# Patient Record
Sex: FEMALE | Race: WHITE | NOT HISPANIC OR LATINO | Employment: OTHER | URBAN - METROPOLITAN AREA
[De-identification: names, ages, dates, MRNs, and addresses within clinical notes are randomized per-mention and may not be internally consistent; named-entity substitution may affect disease eponyms.]

---

## 2019-02-05 ENCOUNTER — TELEPHONE (OUTPATIENT)
Dept: NEUROLOGY | Facility: CLINIC | Age: 69
End: 2019-02-05

## 2019-02-05 NOTE — TELEPHONE ENCOUNTER
Spoke with patient about getting an appointment with Dr. Ahuja. Told patient I would check around in the schedule between the two campus we go to and told her I would give her a call tomorrow with some more information on an appointment. Patient was please said she was looking forwarding to talking tomorrow.

## 2019-02-05 NOTE — TELEPHONE ENCOUNTER
----- Message from Sparkle Whitten sent at 2/5/2019  9:32 AM CST -----  Contact: Pt  Name of Who is CallingMIGUEL POWELL [[id number]]    What is the request in detail:Pt is calling in regards to getting information on [facility] therapy for her back.Please contact to further discuss and advise.        Can the clinic reply by [facility]:no      What Number to Call Back if not in [facility]:[phone]

## 2019-02-11 ENCOUNTER — TELEPHONE (OUTPATIENT)
Dept: NEUROLOGY | Facility: CLINIC | Age: 69
End: 2019-02-11

## 2019-02-11 NOTE — TELEPHONE ENCOUNTER
Franky Thorne,    Did you call the patient in regards to getting her an  appointment? Patient stated she spoke with you on the 5th and it has been a couple days and she still has not received a call. Please give this patient a call today to discuss this matter.   Thanks,  Hilton

## 2019-02-11 NOTE — TELEPHONE ENCOUNTER
----- Message from Jillian Verma sent at 2/11/2019 10:04 AM CST -----  Contact: pt  Name of Who is Calling:Shad      What is the request in detail: Pt is still awaiting a call back in reference to previous msg left on 2/05. Please ana dn advise       Can the clinic reply by [facility]: no      What Number to Call Back if not in [facility]: [phone]

## 2019-05-07 ENCOUNTER — OFFICE VISIT (OUTPATIENT)
Dept: NEUROLOGY | Facility: CLINIC | Age: 69
End: 2019-05-07
Payer: MEDICARE

## 2019-05-07 VITALS
BODY MASS INDEX: 28.02 KG/M2 | SYSTOLIC BLOOD PRESSURE: 130 MMHG | DIASTOLIC BLOOD PRESSURE: 80 MMHG | HEIGHT: 65 IN | WEIGHT: 168.19 LBS | HEART RATE: 78 BPM

## 2019-05-07 DIAGNOSIS — R51.9 CHRONIC DAILY HEADACHE: Primary | ICD-10-CM

## 2019-05-07 PROCEDURE — 99203 OFFICE O/P NEW LOW 30 MIN: CPT | Mod: S$GLB,,, | Performed by: PSYCHIATRY & NEUROLOGY

## 2019-05-07 PROCEDURE — 99999 PR PBB SHADOW E&M-EST. PATIENT-LVL III: ICD-10-PCS | Mod: PBBFAC,,, | Performed by: PSYCHIATRY & NEUROLOGY

## 2019-05-07 PROCEDURE — 99203 PR OFFICE/OUTPT VISIT, NEW, LEVL III, 30-44 MIN: ICD-10-PCS | Mod: S$GLB,,, | Performed by: PSYCHIATRY & NEUROLOGY

## 2019-05-07 PROCEDURE — 99999 PR PBB SHADOW E&M-EST. PATIENT-LVL III: CPT | Mod: PBBFAC,,, | Performed by: PSYCHIATRY & NEUROLOGY

## 2019-05-07 RX ORDER — ATORVASTATIN CALCIUM 20 MG/1
TABLET, FILM COATED ORAL
COMMUNITY
Start: 2019-05-06

## 2019-05-07 RX ORDER — ESCITALOPRAM OXALATE 20 MG/1
TABLET ORAL
COMMUNITY
Start: 2019-05-06

## 2019-05-07 RX ORDER — PANTOPRAZOLE SODIUM 40 MG/1
TABLET, DELAYED RELEASE ORAL
COMMUNITY
Start: 2019-05-06

## 2019-05-07 RX ORDER — HYDROCODONE BITARTRATE AND ACETAMINOPHEN 10; 325 MG/1; MG/1
TABLET ORAL
COMMUNITY
Start: 2018-12-05

## 2019-05-07 RX ORDER — TOPIRAMATE 200 MG/1
TABLET ORAL
COMMUNITY
Start: 2018-12-05 | End: 2021-10-05

## 2019-05-07 RX ORDER — MELOXICAM 15 MG/1
15 TABLET ORAL
COMMUNITY
Start: 2019-03-28 | End: 2021-10-05

## 2019-05-07 RX ORDER — TIZANIDINE 4 MG/1
TABLET ORAL
COMMUNITY
Start: 2019-05-06

## 2019-05-07 RX ORDER — BUPROPION HYDROCHLORIDE 150 MG/1
TABLET, EXTENDED RELEASE ORAL
COMMUNITY
Start: 2018-10-02

## 2019-05-07 RX ORDER — LEVOCETIRIZINE DIHYDROCHLORIDE 5 MG/1
TABLET, FILM COATED ORAL
COMMUNITY
Start: 2019-03-13

## 2019-05-07 NOTE — PROGRESS NOTES
"Subjective:      Patient ID: Shad Knutson is a 69 y.o. female.    Chief Complaint: I have headaches every day     This right-handed patient indicates that she has had a chronic daily headache for a large number of years and has been through a great deal of treatment without benefit.  The patient states that her headache is present when she awakens in the morning and is present throughout the day getting worse as the day goes by.  Her worst headache a occurs between 7 and 8:00 p.m. In the evening although she has headache throughout the day.  The pain apparently is felt 1st as a dull discomfort in the back of the head neck which then worsens and spreads to the bitemporal and biparietal area in the afternoon.  When the pain is severe she describes the pain as "exploding" and is "a ticking time bomb. "Ce    The patient states that when the pain is severe she experiences nausea and vomiting and is incapacitated.  She has found that when the pain is there is that she has to be very careful with her diet as "heavy food" will clearly aggravated her nausea and vomiting.    In review of her medical chart but also in conversation with the patient, she has been on multiple modalities of treatment through the years.  This has included among other treatments an occipital nerve neurectomy, Cefaly,  Trigger point injections, Botox, and multiple medications.  At 1 point in time, the patient was on Topamax to a dose level of 900 mg daily.  The patient states that she was withdrawn from Topamax but could not tolerate being off of the medication and currently is back on Topamax 600 mg a day.  She has also been on multiple other medications including Elavil, Aimovig (which made her headache worse), various triptans, and Norco which she now limits to 2 tablets a day usually 2-3 days a week.  The patient has had acupuncture but has not tried biofeedback.  The patient has seen neurologists both in   [place]  as well as in " [place].        ROS:  GENERAL: NO FEVER, CHILLS, FATIGABILITY OR WEIGHT LOSS.  SKIN: NO RASHES, ITCHING OR CHANGES IN COLOR OR TEXTURE OF SKIN.  HEAD: NO RECENT HEAD TRAUMA.  EYES: VISUAL ACUITY FINE. NO PHOTOPHOBIA, OCULAR PAIN OR DIPLOPIA.  EARS: DENIES EAR PAIN, DISCHARGE OR VERTIGO.  NOSE: NO LOSS OF SMELL, NO EPISTAXIS OR POSTNASAL DRIP.  MOUTH & THROAT: NO HOARSENESS OR CHANGE IN VOICE. NO EXCESSIVE GUM BLEEDING.  NODES: DENIES SWOLLEN GLANDS.  CHEST: DENIES ROBBINS, CYANOSIS, WHEEZING, COUGH AND SPUTUM PRODUCTION.  CARDIOVASCULAR: DENIES CHEST PAIN, PND, ORTHOPNEA OR REDUCED EXERCISE TOLERANCE.  ABDOMEN: APPETITE FINE. NO WEIGHT LOSS. DENIES DIARRHEA, ABDOMINAL PAIN, HEMATEMESIS OR BLOOD IN STOOL.  URINARY: NO FLANK PAIN, DYSURIA OR HEMATURIA.  PERIPHERAL VASCULAR: NO CLAUDICATION OR CYANOSIS.  MUSCULOSKELETAL: NO JOINT STIFFNESS OR SWELLING. DENIES BACK PAIN.  NEUROLOGIC: NO HISTORY OF SEIZURES, PARALYSIS, ALTERATION OF GAIT OR COORDINATION.    Past Medical History:   Diagnosis Date    Headache     Hearing loss     Mental disorder      History reviewed. No pertinent surgical history.  History reviewed. No pertinent family history.  Social History     Socioeconomic History    Marital status:      Spouse name: Not on file    Number of children: Not on file    Years of education: Not on file    Highest education level: Not on file   Occupational History    Not on file   Social Needs    Financial resource strain: Not on file    Food insecurity:     Worry: Not on file     Inability: Not on file    Transportation needs:     Medical: Not on file     Non-medical: Not on file   Tobacco Use    Smoking status: Never Smoker    Smokeless tobacco: Never Used   Substance and Sexual Activity    Alcohol use: Yes    Drug use: Never    Sexual activity: Yes     Partners: Male   Lifestyle    Physical activity:     Days per week: Not on file     Minutes per session: Not on file    Stress: Not on file    Relationships    Social connections:     Talks on phone: Not on file     Gets together: Not on file     Attends [employer] service: Not on file     Active member of club or organization: Not on file     Attends meetings of clubs or organizations: Not on file     Relationship status: Not on file   Other Topics Concern    Not on file   Social History Narrative    Not on file         Objective:   PE:   VITAL SIGNS:  Blood pressure 130/80, pulse 78, weight 76.3 kg, height 5 ft 5 in, BMI 27.99  Vitals:    05/07/19 1022   BP: 130/80   Pulse: 78      formal neurologic and physical examination was not performed on today's visit as a history was obtained in detail and it was determined that this examiner would not be able to offer her any further assistance with management of her chronic daily headache.      Assessment:     Encounter Diagnosis   Name Primary?    Chronic daily headache Yes       Plan:     1.  The patient has found a provider in [place] who may be able to offer her a different approach to her headache syndrome and we will make that referral today to Dr. Larry Ahuja. In the interval, she is to continue her medications as provided by her primary care physician.      This was a 45 min visit with the patient and her  with 100% of the time spent counseling the patient and  regarding the treatment of her chronic daily headache and discussion of future management of chronic daily headache.  This note is generated with speech recognition software and is subject to transcription error and sound alike phrases that may be missed by proofreading.

## 2019-05-16 ENCOUNTER — TELEPHONE (OUTPATIENT)
Dept: NEPHROLOGY | Facility: CLINIC | Age: 69
End: 2019-05-16

## 2019-05-16 NOTE — TELEPHONE ENCOUNTER
----- Message from Jillian Fabian sent at 5/16/2019  1:53 PM CDT -----  Contact: Self  Pt is calling in regards to referral      pls call pt back at [phone]

## 2019-05-16 NOTE — TELEPHONE ENCOUNTER
Called to give her the number to [facility] to schedule with them. They did not schedule her for dr almonte due to the note that he only sees TBI,concussion and not ha. She will call them now. Sent message to dr green about the ref.5/16/1916/19/1656/sf

## 2019-05-17 ENCOUNTER — TELEPHONE (OUTPATIENT)
Dept: NEUROLOGY | Facility: CLINIC | Age: 69
End: 2019-05-17

## 2019-05-17 DIAGNOSIS — G43.019 INTRACTABLE MIGRAINE WITHOUT AURA AND WITHOUT STATUS MIGRAINOSUS: Primary | ICD-10-CM

## 2019-05-17 NOTE — TELEPHONE ENCOUNTER
----- Message from Cassie Garza LPN sent at 5/16/2019  4:50 PM CDT -----  Contact: Self  Please rewrite referral for n.o. Neurology from dr almonte to [facility]... He only wants to see TBI and concussion etc thanks  ----- Message -----  From: Jillian Fabian  Sent: 5/16/2019   1:53 PM  To: Hyun FERREIRA Staff    Pt is calling in regards to referral      pls call pt back at [phone]

## 2019-05-24 ENCOUNTER — TELEPHONE (OUTPATIENT)
Dept: NEUROLOGY | Facility: CLINIC | Age: 69
End: 2019-05-24

## 2019-05-24 NOTE — TELEPHONE ENCOUNTER
----- Message from Tiana Silverman, RN sent at 5/21/2019 12:28 PM CDT -----  Danie,  Could you try to schedule Patient with Dr Beltran?Dr Gonzalez's schedule is out until July.  Thank you so much,  Tiana  ----- Message -----  From: Bob Lira  Sent: 5/21/2019  11:40 AM  To: Carlos Walters Staff    Needs Advice    Reason for call: Pt is calling to schedule an appt w/ the doctor. Referral in [facility]        Communication Preference: [phone]    Additional Information:

## 2021-08-11 ENCOUNTER — TELEPHONE (OUTPATIENT)
Dept: NEUROLOGY | Facility: CLINIC | Age: 71
End: 2021-08-11

## 2021-08-16 ENCOUNTER — TELEPHONE (OUTPATIENT)
Dept: NEUROLOGY | Facility: CLINIC | Age: 71
End: 2021-08-16

## 2021-10-05 ENCOUNTER — OFFICE VISIT (OUTPATIENT)
Dept: NEUROLOGY | Facility: CLINIC | Age: 71
End: 2021-10-05
Payer: MEDICARE

## 2021-10-05 VITALS
WEIGHT: 162.5 LBS | DIASTOLIC BLOOD PRESSURE: 76 MMHG | HEIGHT: 65 IN | HEART RATE: 75 BPM | SYSTOLIC BLOOD PRESSURE: 124 MMHG | BODY MASS INDEX: 27.07 KG/M2

## 2021-10-05 DIAGNOSIS — G44.86 CERVICOGENIC HEADACHE: ICD-10-CM

## 2021-10-05 DIAGNOSIS — M54.81 BILATERAL OCCIPITAL NEURALGIA: Primary | ICD-10-CM

## 2021-10-05 PROCEDURE — 1125F AMNT PAIN NOTED PAIN PRSNT: CPT | Mod: CPTII,S$GLB,, | Performed by: PSYCHIATRY & NEUROLOGY

## 2021-10-05 PROCEDURE — 3288F FALL RISK ASSESSMENT DOCD: CPT | Mod: CPTII,S$GLB,, | Performed by: PSYCHIATRY & NEUROLOGY

## 2021-10-05 PROCEDURE — 99215 PR OFFICE/OUTPT VISIT, EST, LEVL V, 40-54 MIN: ICD-10-PCS | Mod: S$GLB,,, | Performed by: PSYCHIATRY & NEUROLOGY

## 2021-10-05 PROCEDURE — 1101F PT FALLS ASSESS-DOCD LE1/YR: CPT | Mod: CPTII,S$GLB,, | Performed by: PSYCHIATRY & NEUROLOGY

## 2021-10-05 PROCEDURE — 99999 PR PBB SHADOW E&M-EST. PATIENT-LVL III: ICD-10-PCS | Mod: PBBFAC,,, | Performed by: PSYCHIATRY & NEUROLOGY

## 2021-10-05 PROCEDURE — 99999 PR PBB SHADOW E&M-EST. PATIENT-LVL III: CPT | Mod: PBBFAC,,, | Performed by: PSYCHIATRY & NEUROLOGY

## 2021-10-05 PROCEDURE — 3074F PR MOST RECENT SYSTOLIC BLOOD PRESSURE < 130 MM HG: ICD-10-PCS | Mod: CPTII,S$GLB,, | Performed by: PSYCHIATRY & NEUROLOGY

## 2021-10-05 PROCEDURE — 3078F DIAST BP <80 MM HG: CPT | Mod: CPTII,S$GLB,, | Performed by: PSYCHIATRY & NEUROLOGY

## 2021-10-05 PROCEDURE — 1125F PR PAIN SEVERITY QUANTIFIED, PAIN PRESENT: ICD-10-PCS | Mod: CPTII,S$GLB,, | Performed by: PSYCHIATRY & NEUROLOGY

## 2021-10-05 PROCEDURE — 3074F SYST BP LT 130 MM HG: CPT | Mod: CPTII,S$GLB,, | Performed by: PSYCHIATRY & NEUROLOGY

## 2021-10-05 PROCEDURE — 3008F BODY MASS INDEX DOCD: CPT | Mod: CPTII,S$GLB,, | Performed by: PSYCHIATRY & NEUROLOGY

## 2021-10-05 PROCEDURE — 1159F MED LIST DOCD IN RCRD: CPT | Mod: CPTII,S$GLB,, | Performed by: PSYCHIATRY & NEUROLOGY

## 2021-10-05 PROCEDURE — 3288F PR FALLS RISK ASSESSMENT DOCUMENTED: ICD-10-PCS | Mod: CPTII,S$GLB,, | Performed by: PSYCHIATRY & NEUROLOGY

## 2021-10-05 PROCEDURE — 1160F RVW MEDS BY RX/DR IN RCRD: CPT | Mod: CPTII,S$GLB,, | Performed by: PSYCHIATRY & NEUROLOGY

## 2021-10-05 PROCEDURE — 1160F PR REVIEW ALL MEDS BY PRESCRIBER/CLIN PHARMACIST DOCUMENTED: ICD-10-PCS | Mod: CPTII,S$GLB,, | Performed by: PSYCHIATRY & NEUROLOGY

## 2021-10-05 PROCEDURE — 99215 OFFICE O/P EST HI 40 MIN: CPT | Mod: S$GLB,,, | Performed by: PSYCHIATRY & NEUROLOGY

## 2021-10-05 PROCEDURE — 1159F PR MEDICATION LIST DOCUMENTED IN MEDICAL RECORD: ICD-10-PCS | Mod: CPTII,S$GLB,, | Performed by: PSYCHIATRY & NEUROLOGY

## 2021-10-05 PROCEDURE — 3008F PR BODY MASS INDEX (BMI) DOCUMENTED: ICD-10-PCS | Mod: CPTII,S$GLB,, | Performed by: PSYCHIATRY & NEUROLOGY

## 2021-10-05 PROCEDURE — 1101F PR PT FALLS ASSESS DOC 0-1 FALLS W/OUT INJ PAST YR: ICD-10-PCS | Mod: CPTII,S$GLB,, | Performed by: PSYCHIATRY & NEUROLOGY

## 2021-10-05 PROCEDURE — 3078F PR MOST RECENT DIASTOLIC BLOOD PRESSURE < 80 MM HG: ICD-10-PCS | Mod: CPTII,S$GLB,, | Performed by: PSYCHIATRY & NEUROLOGY

## 2021-10-05 RX ORDER — CHOLECALCIFEROL (VITAMIN D3) 25 MCG
1000 TABLET ORAL DAILY
COMMUNITY

## 2021-10-05 RX ORDER — OXYCODONE AND ACETAMINOPHEN 10; 325 MG/1; MG/1
1 TABLET ORAL EVERY 4 HOURS PRN
COMMUNITY

## 2021-10-05 RX ORDER — ASPIRIN 81 MG
TABLET,CHEWABLE ORAL
COMMUNITY
Start: 2021-06-08

## 2021-10-05 RX ORDER — MECLIZINE HYDROCHLORIDE 25 MG/1
TABLET ORAL
COMMUNITY
Start: 2021-07-26

## 2021-10-06 ENCOUNTER — PATIENT MESSAGE (OUTPATIENT)
Dept: NEUROLOGY | Facility: CLINIC | Age: 71
End: 2021-10-06

## 2021-10-06 ENCOUNTER — HOSPITAL ENCOUNTER (OUTPATIENT)
Dept: RADIOLOGY | Facility: HOSPITAL | Age: 71
Discharge: HOME | End: 2021-10-06
Attending: PSYCHIATRY & NEUROLOGY
Payer: MEDICARE

## 2021-10-06 DIAGNOSIS — G44.86 CERVICOGENIC HEADACHE: ICD-10-CM

## 2021-10-06 PROCEDURE — 72050 X-RAY EXAM NECK SPINE 4/5VWS: CPT | Mod: TC

## 2021-10-11 ENCOUNTER — TELEPHONE (OUTPATIENT)
Dept: NEUROLOGY | Facility: CLINIC | Age: 71
End: 2021-10-11

## 2021-10-28 ENCOUNTER — OFFICE VISIT (OUTPATIENT)
Dept: NEUROLOGY | Facility: CLINIC | Age: 71
End: 2021-10-28
Payer: MEDICARE

## 2021-10-28 VITALS
HEART RATE: 71 BPM | BODY MASS INDEX: 26.99 KG/M2 | DIASTOLIC BLOOD PRESSURE: 91 MMHG | SYSTOLIC BLOOD PRESSURE: 148 MMHG | WEIGHT: 162 LBS | HEIGHT: 65 IN

## 2021-10-28 DIAGNOSIS — M54.81 BILATERAL OCCIPITAL NEURALGIA: ICD-10-CM

## 2021-10-28 DIAGNOSIS — G44.86 CERVICOGENIC HEADACHE: ICD-10-CM

## 2021-10-28 PROCEDURE — 1125F PR PAIN SEVERITY QUANTIFIED, PAIN PRESENT: ICD-10-PCS | Mod: CPTII,S$GLB,, | Performed by: PSYCHIATRY & NEUROLOGY

## 2021-10-28 PROCEDURE — 3077F SYST BP >= 140 MM HG: CPT | Mod: CPTII,S$GLB,, | Performed by: PSYCHIATRY & NEUROLOGY

## 2021-10-28 PROCEDURE — 1101F PR PT FALLS ASSESS DOC 0-1 FALLS W/OUT INJ PAST YR: ICD-10-PCS | Mod: CPTII,S$GLB,, | Performed by: PSYCHIATRY & NEUROLOGY

## 2021-10-28 PROCEDURE — 3008F PR BODY MASS INDEX (BMI) DOCUMENTED: ICD-10-PCS | Mod: CPTII,S$GLB,, | Performed by: PSYCHIATRY & NEUROLOGY

## 2021-10-28 PROCEDURE — 3008F BODY MASS INDEX DOCD: CPT | Mod: CPTII,S$GLB,, | Performed by: PSYCHIATRY & NEUROLOGY

## 2021-10-28 PROCEDURE — 3288F FALL RISK ASSESSMENT DOCD: CPT | Mod: CPTII,S$GLB,, | Performed by: PSYCHIATRY & NEUROLOGY

## 2021-10-28 PROCEDURE — 99499 NO LOS: ICD-10-PCS | Mod: S$GLB,,, | Performed by: PSYCHIATRY & NEUROLOGY

## 2021-10-28 PROCEDURE — 64405 PR NERVE BLOCK INJ, ANES/STEROID, OCCIPITAL: ICD-10-PCS | Mod: 50,51,S$GLB, | Performed by: PSYCHIATRY & NEUROLOGY

## 2021-10-28 PROCEDURE — 99999 PR PBB SHADOW E&M-EST. PATIENT-LVL III: CPT | Mod: PBBFAC,,, | Performed by: PSYCHIATRY & NEUROLOGY

## 2021-10-28 PROCEDURE — 64450 PR NERVE BLOCK INJ, ANES/STEROID, OTHER PERIPHERAL: ICD-10-PCS | Mod: 50,S$GLB,, | Performed by: PSYCHIATRY & NEUROLOGY

## 2021-10-28 PROCEDURE — 3077F PR MOST RECENT SYSTOLIC BLOOD PRESSURE >= 140 MM HG: ICD-10-PCS | Mod: CPTII,S$GLB,, | Performed by: PSYCHIATRY & NEUROLOGY

## 2021-10-28 PROCEDURE — 3080F DIAST BP >= 90 MM HG: CPT | Mod: CPTII,S$GLB,, | Performed by: PSYCHIATRY & NEUROLOGY

## 2021-10-28 PROCEDURE — 64450 NJX AA&/STRD OTHER PN/BRANCH: CPT | Mod: 50,S$GLB,, | Performed by: PSYCHIATRY & NEUROLOGY

## 2021-10-28 PROCEDURE — 1101F PT FALLS ASSESS-DOCD LE1/YR: CPT | Mod: CPTII,S$GLB,, | Performed by: PSYCHIATRY & NEUROLOGY

## 2021-10-28 PROCEDURE — 76942 ECHO GUIDE FOR BIOPSY: CPT | Mod: S$GLB,,, | Performed by: PSYCHIATRY & NEUROLOGY

## 2021-10-28 PROCEDURE — 64405 NJX AA&/STRD GR OCPL NRV: CPT | Mod: 50,51,S$GLB, | Performed by: PSYCHIATRY & NEUROLOGY

## 2021-10-28 PROCEDURE — 99499 UNLISTED E&M SERVICE: CPT | Mod: S$GLB,,, | Performed by: PSYCHIATRY & NEUROLOGY

## 2021-10-28 PROCEDURE — 1159F MED LIST DOCD IN RCRD: CPT | Mod: CPTII,S$GLB,, | Performed by: PSYCHIATRY & NEUROLOGY

## 2021-10-28 PROCEDURE — 1125F AMNT PAIN NOTED PAIN PRSNT: CPT | Mod: CPTII,S$GLB,, | Performed by: PSYCHIATRY & NEUROLOGY

## 2021-10-28 PROCEDURE — 3080F PR MOST RECENT DIASTOLIC BLOOD PRESSURE >= 90 MM HG: ICD-10-PCS | Mod: CPTII,S$GLB,, | Performed by: PSYCHIATRY & NEUROLOGY

## 2021-10-28 PROCEDURE — 76942 PR U/S GUIDANCE FOR NEEDLE GUIDANCE: ICD-10-PCS | Mod: S$GLB,,, | Performed by: PSYCHIATRY & NEUROLOGY

## 2021-10-28 PROCEDURE — 1159F PR MEDICATION LIST DOCUMENTED IN MEDICAL RECORD: ICD-10-PCS | Mod: CPTII,S$GLB,, | Performed by: PSYCHIATRY & NEUROLOGY

## 2021-10-28 PROCEDURE — 3288F PR FALLS RISK ASSESSMENT DOCUMENTED: ICD-10-PCS | Mod: CPTII,S$GLB,, | Performed by: PSYCHIATRY & NEUROLOGY

## 2021-10-28 PROCEDURE — 99999 PR PBB SHADOW E&M-EST. PATIENT-LVL III: ICD-10-PCS | Mod: PBBFAC,,, | Performed by: PSYCHIATRY & NEUROLOGY

## 2021-11-08 ENCOUNTER — PATIENT MESSAGE (OUTPATIENT)
Dept: NEUROLOGY | Facility: CLINIC | Age: 71
End: 2021-11-08
Payer: MEDICARE

## 2021-11-09 ENCOUNTER — PATIENT MESSAGE (OUTPATIENT)
Dept: NEUROLOGY | Facility: CLINIC | Age: 71
End: 2021-11-09
Payer: MEDICARE

## 2021-11-09 DIAGNOSIS — G44.86 CERVICOGENIC HEADACHE: Primary | ICD-10-CM

## 2021-11-14 ENCOUNTER — PATIENT MESSAGE (OUTPATIENT)
Dept: NEUROLOGY | Facility: CLINIC | Age: 71
End: 2021-11-14
Payer: MEDICARE

## 2021-11-18 ENCOUNTER — TELEPHONE (OUTPATIENT)
Dept: PHARMACY | Facility: CLINIC | Age: 71
End: 2021-11-18
Payer: MEDICARE

## 2021-12-16 ENCOUNTER — PATIENT MESSAGE (OUTPATIENT)
Dept: NEUROLOGY | Facility: CLINIC | Age: 71
End: 2021-12-16
Payer: MEDICARE

## 2021-12-16 ENCOUNTER — TELEPHONE (OUTPATIENT)
Dept: NEUROLOGY | Facility: CLINIC | Age: 71
End: 2021-12-16
Payer: MEDICARE

## 2021-12-16 DIAGNOSIS — M50.30 DDD (DEGENERATIVE DISC DISEASE), CERVICAL: ICD-10-CM

## 2021-12-16 DIAGNOSIS — G43.001 MIGRAINE WITHOUT AURA AND WITH STATUS MIGRAINOSUS, NOT INTRACTABLE: ICD-10-CM

## 2021-12-16 DIAGNOSIS — G43.709 CHRONIC MIGRAINE WITHOUT AURA WITHOUT STATUS MIGRAINOSUS, NOT INTRACTABLE: ICD-10-CM

## 2021-12-16 DIAGNOSIS — M54.81 BILATERAL OCCIPITAL NEURALGIA: Primary | ICD-10-CM

## 2021-12-16 RX ORDER — SODIUM CHLORIDE 0.9 % (FLUSH) 0.9 %
10 SYRINGE (ML) INJECTION
Status: CANCELLED | OUTPATIENT
Start: 2021-12-16

## 2021-12-16 RX ORDER — ONDANSETRON 2 MG/ML
8 INJECTION INTRAMUSCULAR; INTRAVENOUS ONCE AS NEEDED
Status: CANCELLED | OUTPATIENT
Start: 2021-12-16

## 2021-12-16 RX ORDER — SODIUM CHLORIDE 9 MG/ML
INJECTION, SOLUTION INTRAVENOUS ONCE AS NEEDED
Status: CANCELLED | OUTPATIENT
Start: 2021-12-16

## 2021-12-16 RX ORDER — ACETAMINOPHEN 500 MG
1000 TABLET ORAL ONCE AS NEEDED
Status: CANCELLED | OUTPATIENT
Start: 2021-12-16

## 2021-12-16 RX ORDER — DIPHENHYDRAMINE HYDROCHLORIDE 50 MG/ML
25 INJECTION INTRAMUSCULAR; INTRAVENOUS ONCE AS NEEDED
Status: CANCELLED | OUTPATIENT
Start: 2021-12-16

## 2021-12-29 ENCOUNTER — PATIENT MESSAGE (OUTPATIENT)
Dept: NEUROLOGY | Facility: CLINIC | Age: 71
End: 2021-12-29
Payer: MEDICARE

## 2022-01-10 ENCOUNTER — HOSPITAL ENCOUNTER (OUTPATIENT)
Dept: RADIOLOGY | Facility: HOSPITAL | Age: 72
Discharge: HOME | End: 2022-01-10
Attending: PSYCHIATRY & NEUROLOGY
Payer: MEDICARE

## 2022-01-10 ENCOUNTER — INFUSION (OUTPATIENT)
Dept: INFECTIOUS DISEASES | Facility: HOSPITAL | Age: 72
End: 2022-01-10
Attending: INTERNAL MEDICINE
Payer: MEDICARE

## 2022-01-10 VITALS
BODY MASS INDEX: 27.44 KG/M2 | HEART RATE: 69 BPM | TEMPERATURE: 97 F | SYSTOLIC BLOOD PRESSURE: 153 MMHG | RESPIRATION RATE: 16 BRPM | OXYGEN SATURATION: 98 % | WEIGHT: 164.88 LBS | DIASTOLIC BLOOD PRESSURE: 70 MMHG

## 2022-01-10 DIAGNOSIS — M50.30 DDD (DEGENERATIVE DISC DISEASE), CERVICAL: ICD-10-CM

## 2022-01-10 DIAGNOSIS — M54.81 BILATERAL OCCIPITAL NEURALGIA: ICD-10-CM

## 2022-01-10 DIAGNOSIS — G43.709 CHRONIC MIGRAINE WITHOUT AURA WITHOUT STATUS MIGRAINOSUS, NOT INTRACTABLE: Primary | ICD-10-CM

## 2022-01-10 PROCEDURE — 63600175 PHARM REV CODE 636 W HCPCS: Performed by: PSYCHIATRY & NEUROLOGY

## 2022-01-10 PROCEDURE — 25000003 PHARM REV CODE 250: Performed by: PSYCHIATRY & NEUROLOGY

## 2022-01-10 PROCEDURE — 72141 MRI CERVICAL SPINE WITHOUT CONTRAST: ICD-10-PCS | Mod: 26,,, | Performed by: RADIOLOGY

## 2022-01-10 PROCEDURE — 96413 CHEMO IV INFUSION 1 HR: CPT

## 2022-01-10 PROCEDURE — 72141 MRI NECK SPINE W/O DYE: CPT | Mod: TC

## 2022-01-10 PROCEDURE — 72141 MRI NECK SPINE W/O DYE: CPT | Mod: 26,,, | Performed by: RADIOLOGY

## 2022-01-10 RX ORDER — SODIUM CHLORIDE 9 MG/ML
INJECTION, SOLUTION INTRAVENOUS ONCE AS NEEDED
Status: DISCONTINUED | OUTPATIENT
Start: 2022-01-10 | End: 2022-01-10 | Stop reason: HOSPADM

## 2022-01-10 RX ORDER — SODIUM CHLORIDE 0.9 % (FLUSH) 0.9 %
10 SYRINGE (ML) INJECTION
Status: DISCONTINUED | OUTPATIENT
Start: 2022-01-10 | End: 2022-01-10 | Stop reason: HOSPADM

## 2022-01-10 RX ORDER — ONDANSETRON 2 MG/ML
8 INJECTION INTRAMUSCULAR; INTRAVENOUS ONCE AS NEEDED
Status: DISCONTINUED | OUTPATIENT
Start: 2022-01-10 | End: 2022-01-10 | Stop reason: HOSPADM

## 2022-01-10 RX ORDER — ACETAMINOPHEN 500 MG
1000 TABLET ORAL ONCE AS NEEDED
Status: DISCONTINUED | OUTPATIENT
Start: 2022-01-10 | End: 2022-01-10 | Stop reason: HOSPADM

## 2022-01-10 RX ORDER — SODIUM CHLORIDE 9 MG/ML
INJECTION, SOLUTION INTRAVENOUS ONCE AS NEEDED
Status: CANCELLED | OUTPATIENT
Start: 2022-01-10

## 2022-01-10 RX ORDER — SODIUM CHLORIDE 0.9 % (FLUSH) 0.9 %
10 SYRINGE (ML) INJECTION
Status: CANCELLED | OUTPATIENT
Start: 2022-01-10

## 2022-01-10 RX ORDER — ACETAMINOPHEN 500 MG
1000 TABLET ORAL ONCE AS NEEDED
Status: CANCELLED | OUTPATIENT
Start: 2022-01-10

## 2022-01-10 RX ORDER — DIPHENHYDRAMINE HYDROCHLORIDE 50 MG/ML
25 INJECTION INTRAMUSCULAR; INTRAVENOUS ONCE AS NEEDED
Status: CANCELLED | OUTPATIENT
Start: 2022-01-10

## 2022-01-10 RX ORDER — ONDANSETRON 2 MG/ML
8 INJECTION INTRAMUSCULAR; INTRAVENOUS ONCE AS NEEDED
Status: CANCELLED | OUTPATIENT
Start: 2022-01-10

## 2022-01-10 RX ORDER — DIPHENHYDRAMINE HYDROCHLORIDE 50 MG/ML
25 INJECTION INTRAMUSCULAR; INTRAVENOUS ONCE AS NEEDED
Status: DISCONTINUED | OUTPATIENT
Start: 2022-01-10 | End: 2022-01-10 | Stop reason: HOSPADM

## 2022-01-10 RX ADMIN — EPTINEZUMAB-JJMR 300 MG: 100 INJECTION INTRAVENOUS at 11:01

## 2022-01-10 RX ADMIN — SODIUM CHLORIDE: 0.9 INJECTION, SOLUTION INTRAVENOUS at 11:01

## 2022-01-13 ENCOUNTER — PATIENT MESSAGE (OUTPATIENT)
Dept: NEUROLOGY | Facility: CLINIC | Age: 72
End: 2022-01-13
Payer: MEDICARE

## 2022-02-04 ENCOUNTER — TELEPHONE (OUTPATIENT)
Dept: NEUROLOGY | Facility: CLINIC | Age: 72
End: 2022-02-04
Payer: MEDICARE

## 2022-02-04 NOTE — TELEPHONE ENCOUNTER
----- Message from Krishna Vallejo sent at 2/4/2022 10:21 AM CST -----  Regarding: appt still needed      The Pt's  states that they sent over a msg 3 weeks ago for a call bk to sched the Pt's appt as Dr. Ahuja said he wanted to see the Pt before her April infusion appt.    Please contact the caller to sched.     # [phone]

## 2022-03-16 ENCOUNTER — OFFICE VISIT (OUTPATIENT)
Dept: NEUROLOGY | Facility: CLINIC | Age: 72
End: 2022-03-16
Payer: MEDICARE

## 2022-03-16 VITALS
HEIGHT: 65 IN | HEART RATE: 70 BPM | SYSTOLIC BLOOD PRESSURE: 133 MMHG | DIASTOLIC BLOOD PRESSURE: 78 MMHG | BODY MASS INDEX: 27.47 KG/M2 | WEIGHT: 164.88 LBS

## 2022-03-16 DIAGNOSIS — M54.81 BILATERAL OCCIPITAL NEURALGIA: ICD-10-CM

## 2022-03-16 DIAGNOSIS — M47.812 ARTHROPATHY OF CERVICAL FACET JOINT: ICD-10-CM

## 2022-03-16 DIAGNOSIS — M50.30 DDD (DEGENERATIVE DISC DISEASE), CERVICAL: Primary | ICD-10-CM

## 2022-03-16 PROCEDURE — 3078F PR MOST RECENT DIASTOLIC BLOOD PRESSURE < 80 MM HG: ICD-10-PCS | Mod: CPTII,S$GLB,, | Performed by: PSYCHIATRY & NEUROLOGY

## 2022-03-16 PROCEDURE — 1100F PR PT FALLS ASSESS DOC 2+ FALLS/FALL W/INJURY/YR: ICD-10-PCS | Mod: CPTII,S$GLB,, | Performed by: PSYCHIATRY & NEUROLOGY

## 2022-03-16 PROCEDURE — 3078F DIAST BP <80 MM HG: CPT | Mod: CPTII,S$GLB,, | Performed by: PSYCHIATRY & NEUROLOGY

## 2022-03-16 PROCEDURE — 3075F PR MOST RECENT SYSTOLIC BLOOD PRESS GE 130-139MM HG: ICD-10-PCS | Mod: CPTII,S$GLB,, | Performed by: PSYCHIATRY & NEUROLOGY

## 2022-03-16 PROCEDURE — 1159F PR MEDICATION LIST DOCUMENTED IN MEDICAL RECORD: ICD-10-PCS | Mod: CPTII,S$GLB,, | Performed by: PSYCHIATRY & NEUROLOGY

## 2022-03-16 PROCEDURE — 99214 OFFICE O/P EST MOD 30 MIN: CPT | Mod: S$GLB,,, | Performed by: PSYCHIATRY & NEUROLOGY

## 2022-03-16 PROCEDURE — 3008F BODY MASS INDEX DOCD: CPT | Mod: CPTII,S$GLB,, | Performed by: PSYCHIATRY & NEUROLOGY

## 2022-03-16 PROCEDURE — 1125F AMNT PAIN NOTED PAIN PRSNT: CPT | Mod: CPTII,S$GLB,, | Performed by: PSYCHIATRY & NEUROLOGY

## 2022-03-16 PROCEDURE — 3288F PR FALLS RISK ASSESSMENT DOCUMENTED: ICD-10-PCS | Mod: CPTII,S$GLB,, | Performed by: PSYCHIATRY & NEUROLOGY

## 2022-03-16 PROCEDURE — 1159F MED LIST DOCD IN RCRD: CPT | Mod: CPTII,S$GLB,, | Performed by: PSYCHIATRY & NEUROLOGY

## 2022-03-16 PROCEDURE — 3288F FALL RISK ASSESSMENT DOCD: CPT | Mod: CPTII,S$GLB,, | Performed by: PSYCHIATRY & NEUROLOGY

## 2022-03-16 PROCEDURE — 1100F PTFALLS ASSESS-DOCD GE2>/YR: CPT | Mod: CPTII,S$GLB,, | Performed by: PSYCHIATRY & NEUROLOGY

## 2022-03-16 PROCEDURE — 99214 PR OFFICE/OUTPT VISIT, EST, LEVL IV, 30-39 MIN: ICD-10-PCS | Mod: S$GLB,,, | Performed by: PSYCHIATRY & NEUROLOGY

## 2022-03-16 PROCEDURE — 1125F PR PAIN SEVERITY QUANTIFIED, PAIN PRESENT: ICD-10-PCS | Mod: CPTII,S$GLB,, | Performed by: PSYCHIATRY & NEUROLOGY

## 2022-03-16 PROCEDURE — 99999 PR PBB SHADOW E&M-EST. PATIENT-LVL IV: CPT | Mod: PBBFAC,,, | Performed by: PSYCHIATRY & NEUROLOGY

## 2022-03-16 PROCEDURE — 99999 PR PBB SHADOW E&M-EST. PATIENT-LVL IV: ICD-10-PCS | Mod: PBBFAC,,, | Performed by: PSYCHIATRY & NEUROLOGY

## 2022-03-16 PROCEDURE — 3075F SYST BP GE 130 - 139MM HG: CPT | Mod: CPTII,S$GLB,, | Performed by: PSYCHIATRY & NEUROLOGY

## 2022-03-16 PROCEDURE — 3008F PR BODY MASS INDEX (BMI) DOCUMENTED: ICD-10-PCS | Mod: CPTII,S$GLB,, | Performed by: PSYCHIATRY & NEUROLOGY

## 2022-03-16 NOTE — Clinical Note
Hey Marco,   I've got this nice lady I'm sending you for eval for AMARJIT or MBB, she has hada cervical fusion at C5-C6 (2005), and seems to have sequelae of ON from C3-4 and C4-C5 left greater than right NFS, probably due to hypermobility above the level of fusion. I have failed U/S nerve blocks on the scalp, so ultimately, I rely on your expertise to help this patient get some relief. As always, I appreciate your expertise.   Sincerely, Larry

## 2022-03-16 NOTE — PROGRESS NOTES
Subjective:       Patient ID: Shad Knutson is a 72 y.o. female.    Reason for Consult: Follow-up      Interval History:  Shad Knutson is here for follow up. Their condition is clinically unchanged. She notes that she is still suffering with daily headaches, ranging from 7-10/10 in intensity headaches, lasting more than 8 hours at a time, from the base of her occiput to the top of her head. We have reviewed her pain diary, showing that my ultrasound guided nerve blocks did not seem to have any appreciable benefit for her. We have reviewed her MRI of the cervical spine and identified that there is C3-C4 left sided mild to moderate NFS and C4-C5 bilateral NFS, moderate on the left.  We have discussed the neuroanatomy on today's visit and discussed the innervation of the Occipital nerves, and the potential for compression of these nerves proximally from muscle spasm closer to the neural foramina.     Objective:     Vitals:    03/16/22 1054   BP: 133/78   Pulse: 70     Limited flexion and extension on cervical ROM. TTP bilateral suboccipital musculature.  Tinel sign positive bilateral greater and lesser occipital nerve with radiation forward.  Focused examination was undertaken today. Most of the visit time was spent giving guidance, counseling and discussing treatment options.    No results found for this or any previous visit.    Assessment/Plan:     Problem List Items Addressed This Visit    None     Visit Diagnoses     DDD (degenerative disc disease), cervical    -  Primary    Relevant Orders    Ambulatory consult to Pain Clinic    Bilateral occipital neuralgia        Relevant Orders    Ambulatory consult to Pain Clinic    Arthropathy of cervical facet joint        Relevant Orders    Ambulatory consult to Pain Clinic        72-year-old female presents for evaluation of complex multifactorial headaches.  At this time I have explained the patient I believe that her C3-C4 and C4-C5 neural foraminal stenosis is  likely causing some impingement of the cervical nerve roots which comprise part of the occipital nerves, also triggering muscle spasm of the cervical paraspinal musculature with further compress the proximal component of the occipital nerves causing occipital neuralgia.  We have tried and failed multiple different medication options including IV anti C Grp antibody therapy and oral migraine medications such as Nurtec.  She has tried over a dozen different medications for migraine.  I explained to the patient I believe she is not dealing with migraine and in fact is dealing with a proximal compression of these occipital nerves.  I will refer her to my colleague in Pain Management to see if she is a candidate for an intervention targeting a transforaminal epidural steroid injection or median branch block as appropriate.  I ultimately defer to the pain management team to help her patient feel better.  I will see the patient back on an as-needed basis.    The patient verbalizes understanding and agreement with the treatment plan. I have discussed risks, benefits and alternatives to the treatment plan. Questions were sought and answered to her stated verbal satisfaction.        Butch Ahuja MD    This note is dictated on M*Modal Fluency Direct word recognition program. There are word recognition mistakes that are occasionally missed on review.    Based on our encounter today, my overall Medical Decision Making is a Level 4 because of Moderate = Review of prior external note(s) from each unique source; Review of the result(s) of each unique test; Ordering of each unique test; and Assessment requiring an independent historian(s) OR Independent interpretation of a test performed by another physician/other qualified health care professional (not separately reported) OR Discussion of management or test interpretation with external physician/other qualified health care professional\appropriate source (not separately reported)   and Moderate = Moderate risk of morbidity from additional diagnostic testing or treatment (e.g. Prescription drug management, Decision regarding minor surgery with identified patient or procedure risk factors, Decision regarding elective major surgery without identified patient or procedure risk factors, Diagnosis or treatment significantly limited by social determinants of health) based on Amount and/or Complexity of Data to be Reviewed and Analyzed and Risk of Complications and/or Morbidity

## 2022-04-11 ENCOUNTER — PATIENT MESSAGE (OUTPATIENT)
Dept: NEUROLOGY | Facility: CLINIC | Age: 72
End: 2022-04-11
Payer: MEDICARE

## 2022-04-20 ENCOUNTER — PATIENT MESSAGE (OUTPATIENT)
Dept: NEUROLOGY | Facility: CLINIC | Age: 72
End: 2022-04-20
Payer: MEDICARE

## 2022-04-29 ENCOUNTER — TELEPHONE (OUTPATIENT)
Dept: PAIN MEDICINE | Facility: CLINIC | Age: 72
End: 2022-04-29
Payer: MEDICARE

## 2022-04-29 NOTE — TELEPHONE ENCOUNTER
This message is for patient in regards to his/her appointment 05/02/22 at 3:00pm      [facility] Policy: For the safety of all patients and staff members.     Patient Visitor policy: During this visit we are asking all patients to only have one visitor over the age of 18yrs old to accompany them to be seen by Josse Ayala MD. If patient do not required assistance with their visit, we're asking all visitors to remain outside the waiting area.    Upon arriving to your schedule appointment; patients are required to wear a face mask, if patient do not have a face mask one will be provided entering the facility. If you have any questions or concerns please contact [phone]       also inquired IPM within message.    [facility] providers and Mid-levels offer interventional, procedure--based options to treat chronic pain. The goal is to manage chronic pain by reducing pain frequency and intensity and address your functional goals for activities of daily living while simultaneously reducing or eliminating your reliance on medications. Please bring any records or images that you have from prior treatments for your pain. You will be presented with multi-modal treatment plan that may or may not include imaging, interventional procedures, physical/occupational/aqua therapy, pain creams, and non-narcotic pain medications used for the treatments of chronic pain.

## 2022-05-02 ENCOUNTER — OFFICE VISIT (OUTPATIENT)
Dept: PAIN MEDICINE | Facility: CLINIC | Age: 72
End: 2022-05-02
Payer: MEDICARE

## 2022-05-02 VITALS
HEART RATE: 71 BPM | SYSTOLIC BLOOD PRESSURE: 122 MMHG | HEIGHT: 68 IN | BODY MASS INDEX: 25.01 KG/M2 | DIASTOLIC BLOOD PRESSURE: 70 MMHG | WEIGHT: 165 LBS

## 2022-05-02 DIAGNOSIS — M47.812 CERVICAL SPONDYLOSIS: Primary | ICD-10-CM

## 2022-05-02 DIAGNOSIS — M50.30 DDD (DEGENERATIVE DISC DISEASE), CERVICAL: ICD-10-CM

## 2022-05-02 DIAGNOSIS — M54.81 BILATERAL OCCIPITAL NEURALGIA: ICD-10-CM

## 2022-05-02 DIAGNOSIS — M47.812 ARTHROPATHY OF CERVICAL FACET JOINT: ICD-10-CM

## 2022-05-02 DIAGNOSIS — G44.86 CERVICOGENIC HEADACHE: ICD-10-CM

## 2022-05-02 PROCEDURE — 1159F PR MEDICATION LIST DOCUMENTED IN MEDICAL RECORD: ICD-10-PCS | Mod: CPTII,S$GLB,, | Performed by: ANESTHESIOLOGY

## 2022-05-02 PROCEDURE — 3288F PR FALLS RISK ASSESSMENT DOCUMENTED: ICD-10-PCS | Mod: CPTII,S$GLB,, | Performed by: ANESTHESIOLOGY

## 2022-05-02 PROCEDURE — 3078F PR MOST RECENT DIASTOLIC BLOOD PRESSURE < 80 MM HG: ICD-10-PCS | Mod: CPTII,S$GLB,, | Performed by: ANESTHESIOLOGY

## 2022-05-02 PROCEDURE — 3078F DIAST BP <80 MM HG: CPT | Mod: CPTII,S$GLB,, | Performed by: ANESTHESIOLOGY

## 2022-05-02 PROCEDURE — 99204 PR OFFICE/OUTPT VISIT, NEW, LEVL IV, 45-59 MIN: ICD-10-PCS | Mod: S$GLB,,, | Performed by: ANESTHESIOLOGY

## 2022-05-02 PROCEDURE — 3008F BODY MASS INDEX DOCD: CPT | Mod: CPTII,S$GLB,, | Performed by: ANESTHESIOLOGY

## 2022-05-02 PROCEDURE — 1125F PR PAIN SEVERITY QUANTIFIED, PAIN PRESENT: ICD-10-PCS | Mod: CPTII,S$GLB,, | Performed by: ANESTHESIOLOGY

## 2022-05-02 PROCEDURE — 1160F RVW MEDS BY RX/DR IN RCRD: CPT | Mod: CPTII,S$GLB,, | Performed by: ANESTHESIOLOGY

## 2022-05-02 PROCEDURE — 99204 OFFICE O/P NEW MOD 45 MIN: CPT | Mod: S$GLB,,, | Performed by: ANESTHESIOLOGY

## 2022-05-02 PROCEDURE — 3008F PR BODY MASS INDEX (BMI) DOCUMENTED: ICD-10-PCS | Mod: CPTII,S$GLB,, | Performed by: ANESTHESIOLOGY

## 2022-05-02 PROCEDURE — 99999 PR PBB SHADOW E&M-EST. PATIENT-LVL III: ICD-10-PCS | Mod: PBBFAC,,, | Performed by: ANESTHESIOLOGY

## 2022-05-02 PROCEDURE — 1160F PR REVIEW ALL MEDS BY PRESCRIBER/CLIN PHARMACIST DOCUMENTED: ICD-10-PCS | Mod: CPTII,S$GLB,, | Performed by: ANESTHESIOLOGY

## 2022-05-02 PROCEDURE — 3288F FALL RISK ASSESSMENT DOCD: CPT | Mod: CPTII,S$GLB,, | Performed by: ANESTHESIOLOGY

## 2022-05-02 PROCEDURE — 1101F PT FALLS ASSESS-DOCD LE1/YR: CPT | Mod: CPTII,S$GLB,, | Performed by: ANESTHESIOLOGY

## 2022-05-02 PROCEDURE — 3074F PR MOST RECENT SYSTOLIC BLOOD PRESSURE < 130 MM HG: ICD-10-PCS | Mod: CPTII,S$GLB,, | Performed by: ANESTHESIOLOGY

## 2022-05-02 PROCEDURE — 99999 PR PBB SHADOW E&M-EST. PATIENT-LVL III: CPT | Mod: PBBFAC,,, | Performed by: ANESTHESIOLOGY

## 2022-05-02 PROCEDURE — 1125F AMNT PAIN NOTED PAIN PRSNT: CPT | Mod: CPTII,S$GLB,, | Performed by: ANESTHESIOLOGY

## 2022-05-02 PROCEDURE — 3074F SYST BP LT 130 MM HG: CPT | Mod: CPTII,S$GLB,, | Performed by: ANESTHESIOLOGY

## 2022-05-02 PROCEDURE — 1101F PR PT FALLS ASSESS DOC 0-1 FALLS W/OUT INJ PAST YR: ICD-10-PCS | Mod: CPTII,S$GLB,, | Performed by: ANESTHESIOLOGY

## 2022-05-02 PROCEDURE — 1159F MED LIST DOCD IN RCRD: CPT | Mod: CPTII,S$GLB,, | Performed by: ANESTHESIOLOGY

## 2022-05-02 NOTE — PROGRESS NOTES
Chronic Pain - New Consult    Referring Physician: Larry Ahuja III, MD    Chief Complaint: Headache and neck pain       SUBJECTIVE:    Shad Knutson presents to the clinic for the evaluation of headache pain. The pain started many years ago  That acutely worsen after hyterectomy and symptoms have been unchanged.The pain is located in the occipital area and radiates to the bilateral temples and forehead.  The pain is described as aching, dull, pulsating, sharp and shooting and is rated as 8/10. The pain is rated with a score of  5/10 on the BEST day and a score of 10/10 on the WORST day.  Symptoms interfere with daily activity, sleeping and work. Symptoms associated with nausea, phonophobia, mild photophobia. No diplopia, no vertigo, no numbness, no aphasia, no dysarthria, no facial asymentry, no changes in vision, no upper extremity weakness. The pain is exacerbated by neck movement.  The pain is mitigated by massage, is the only thing that has helped so far. In the past she has tried; occipital nerve surgical decompression , botox, RENETTA and MERRITT block, multiple medications eptinezumab infusion all without any relief in symptoms. MRI brain from 2018 unremarkable. MRI C spine from 12/16/2021 remarkable for C3-C5 facet arthropathy. Past C6-7 ACDF 2006.    Patient denies night fever/night sweats, urinary incontinence, bowel incontinence, significant weight loss, significant motor weakness and loss of sensations.    Physical Therapy/Home Exercise: not applicable      Pain Disability Index Review:  No flowsheet data found.    Pain Procedures:   10/28/2021 bilateral RENETTA and MERRITT blocks- no relief  Botox- no relief  Surgical occipital nerve release - no relief.     Imaging:   MRI C spine 12/16/2021  Degenerative findings:     C2-C3: No spinal canal stenosis or neural foraminal narrowing.     C3-C4: Posterior disc osteophyte complex and facet arthropathy result in mild bilateral neural foraminal narrowing.     C4-C5:  Posterior disc osteophyte complex effacing the anterior thecal sac and bilateral facet arthropathy, right greater than left, result in mild bilateral neural foraminal narrowing.     C5-C6: No spinal canal stenosis or neural foraminal narrowing.     C6-C7: Posterior disc osteophyte complex asymmetric to the left which abuts the anterior thecal sac.  No spinal canal stenosis or neural foraminal narrowing.     C7-T1: No spinal canal stenosis or neural foraminal narrowing.     Paraspinal muscles & soft tissues: Unremarkable.     Impression:     Mild cervical spondylosis with C3-C5 mild spinal canal narrowing and bilateral neural foraminal narrowing.    Past Medical History:   Diagnosis Date    Headache     Hearing loss     Mental disorder      Past Surgical History:   Procedure Laterality Date    APPENDECTOMY       SECTION      FOOT SURGERY      HYSTERECTOMY      NECK SURGERY      TONSILLECTOMY      WRIST SURGERY       Social History     Socioeconomic History    Marital status:    Tobacco Use    Smoking status: Never Smoker    Smokeless tobacco: Never Used   Substance and Sexual Activity    Alcohol use: Yes    Drug use: Never    Sexual activity: Yes     Partners: Male     Family History   Problem Relation Age of Onset    Breast cancer Mother     Breast cancer Maternal Aunt     Breast cancer Maternal Grandmother     Breast cancer Paternal Grandmother        Review of patient's allergies indicates:   Allergen Reactions    Penicillins Nausea Only and Other (See Comments)     Passed out. No reaction to Cephalosporins         Current Outpatient Medications   Medication Sig    ASPIRIN CHILDRENS 81 mg Chew Take by mouth.    atorvastatin (LIPITOR) 20 MG tablet atorvastatin 20 mg tablet    buPROPion (WELLBUTRIN SR) 150 MG TBSR 12 hr tablet bupropion HCl  mg tablet,12 hr sustained-release    calcium carbonate (CALCIUM 600 ORAL) Take by mouth.    escitalopram oxalate (LEXAPRO) 20 MG  "tablet escitalopram 20 mg tablet    HYDROcodone-acetaminophen (NORCO)  mg per tablet hydrocodone 10 mg-acetaminophen 325 mg tablet   Take 1 tablet every 4 hours by oral route.    levocetirizine (XYZAL) 5 MG tablet Xyzal 5 mg tablet   Take 1 tablet every day by oral route.    meclizine (ANTIVERT) 25 mg tablet     pantoprazole (PROTONIX) 40 MG tablet pantoprazole 40 mg tablet,delayed release    tiZANidine (ZANAFLEX) 4 MG tablet TAKE 1 TABLET BY MOUTH ONCE DAILY AS NEEDED FOR MUSCLE SPASM.    vitamin D (VITAMIN D3) 1000 units Tab Take 1,000 Units by mouth once daily. d3    oxyCODONE-acetaminophen (PERCOCET)  mg per tablet Take 1 tablet by mouth every 4 (four) hours as needed for Pain.     No current facility-administered medications for this visit.       REVIEW OF SYSTEMS:    GENERAL:  No weight loss, malaise or fevers.  HEENT:  Positive: headaches.  NECK:  Pain. Negative for lumps, goiter,and significant neck swelling.  RESPIRATORY:  Negative for cough, wheezing or shortness of breath.  CARDIOVASCULAR:  Negative for chest pain, leg swelling or palpitations.  GI:  Negative for abdominal discomfort, blood in stools or black stools or change in bowel habits.  MUSCULOSKELETAL:  See HPI.  SKIN:  Negative for lesions, rash, and itching.  PSYCH:  Negative for sleep disturbance, mood disorder and recent psychosocial stressors.  HEMATOLOGY/LYMPHOLOGY:  Negative for prolonged bleeding, bruising easily or swollen nodes.  NEURO:  Headaches,No: syncope, paralysis, seizures or tremors.  All other reviewed and negative other than HPI.    OBJECTIVE:    /70   Pulse 71   Ht 5' 8" (1.727 m)   Wt 74.8 kg (165 lb)   BMI 25.09 kg/m²     PHYSICAL EXAMINATION:    General appearance: Well appearing, in no acute distress, alert and oriented x3.  Psych:  Mood and affect appropriate.  Skin: Skin color, texture, turgor normal, no rashes or lesions, in both upper and lower body.  Head/face:  Normocephalic, atraumatic. " No palpable lymph nodes. TTP over occiput at anatomical area of bilateral RENETTA and MERRITT  Neck: TTP over upper cervical paraspinal muscles.  Spurling Negative. Pain with neck flexion, extension, or lateral flexion.   Musculoskeletal:  Bilateral upper extremity strength is normal and symmetric.  No atrophy or tone abnormalities are noted.  Neuro: Bilateral upper  extremity coordination and muscle stretch reflexes are physiologic and symmetric. No loss of sensation is noted.  Gait: normal. CN intact 2-12    ASSESSMENT: 72 y.o. year old female with chronic headache pain, consistent with      1. Cervical spondylosis  Procedure Order to Pain Management   2. DDD (degenerative disc disease), cervical  Ambulatory consult to Pain Clinic    Procedure Order to Pain Management   3. Bilateral occipital neuralgia  Ambulatory consult to Pain Clinic   4. Arthropathy of cervical facet joint  Ambulatory consult to Pain Clinic   5. Cervicogenic headache  Procedure Order to Pain Management         PLAN:     - Schedule for a Diagnostic Medial branch block at bilateral C2,3,4 and TON, if she obtains significant pain relief will move forward with RFA.   -If no relief from cervical MBB  we will consider  an occipital nerve block at C1/2 under ultrasound if no long term relief after this procedure we can then consider PNS trial.    - RTC: 3-4 weeks  - Counseled patient regarding the importance of activity modification, constant sleeping habits and physical therapy.    The above plan and management options were discussed at length with patient. Patient is in agreement with the above and verbalized understanding. It will be communicated with the referring physician via electronic record, fax, or mail.    Jarvis Deal  05/02/2022     I have reviewed and concur with the resident's history, physical, assessment, and plan.  I have personally interviewed and examined the patient at bedside.  See below addendum for my evaluation and additional  findings.    Josse Ayala MD

## 2022-05-04 ENCOUNTER — TELEPHONE (OUTPATIENT)
Dept: ADMINISTRATIVE | Facility: OTHER | Age: 72
End: 2022-05-04
Payer: MEDICARE

## 2022-05-17 RX ORDER — SODIUM CHLORIDE 9 MG/ML
500 INJECTION, SOLUTION INTRAVENOUS CONTINUOUS
Status: CANCELLED | OUTPATIENT
Start: 2022-05-17

## 2022-05-18 ENCOUNTER — HOSPITAL ENCOUNTER (OUTPATIENT)
Facility: OTHER | Age: 72
Discharge: HOME | End: 2022-05-18
Attending: ANESTHESIOLOGY | Admitting: ANESTHESIOLOGY
Payer: MEDICARE

## 2022-05-18 VITALS
RESPIRATION RATE: 14 BRPM | OXYGEN SATURATION: 96 % | SYSTOLIC BLOOD PRESSURE: 172 MMHG | HEART RATE: 65 BPM | HEIGHT: 65 IN | WEIGHT: 163 LBS | BODY MASS INDEX: 27.16 KG/M2 | TEMPERATURE: 98 F | DIASTOLIC BLOOD PRESSURE: 77 MMHG

## 2022-05-18 DIAGNOSIS — M47.812 SPONDYLOSIS WITHOUT MYELOPATHY OR RADICULOPATHY, CERVICAL REGION: Primary | ICD-10-CM

## 2022-05-18 PROCEDURE — 99152 MOD SED SAME PHYS/QHP 5/>YRS: CPT | Mod: ,,, | Performed by: ANESTHESIOLOGY

## 2022-05-18 PROCEDURE — 64490 INJ PARAVERT F JNT C/T 1 LEV: CPT | Mod: 50,KX,, | Performed by: ANESTHESIOLOGY

## 2022-05-18 PROCEDURE — 64491 INJ PARAVERT F JNT C/T 2 LEV: CPT | Mod: 50,KX | Performed by: ANESTHESIOLOGY

## 2022-05-18 PROCEDURE — 64490 INJ PARAVERT F JNT C/T 1 LEV: CPT | Mod: 50,KX | Performed by: ANESTHESIOLOGY

## 2022-05-18 PROCEDURE — 63600175 PHARM REV CODE 636 W HCPCS: Performed by: ANESTHESIOLOGY

## 2022-05-18 PROCEDURE — 99152 PR MOD CONSCIOUS SEDATION, SAME PHYS, 5+ YRS, FIRST 15 MIN: ICD-10-PCS | Mod: ,,, | Performed by: ANESTHESIOLOGY

## 2022-05-18 PROCEDURE — 25000003 PHARM REV CODE 250: Performed by: ANESTHESIOLOGY

## 2022-05-18 PROCEDURE — 64490 PR INJ DX/THER AGNT PARAVERT FACET JOINT,IMG GUIDE,CERV/THORAC, 1ST LEVEL: ICD-10-PCS | Mod: 50,KX,, | Performed by: ANESTHESIOLOGY

## 2022-05-18 PROCEDURE — 64491 PR INJ DX/THER AGNT PARAVERT FACET JOINT,IMG GUIDE,CERV/THORAC, 2ND LEVEL: ICD-10-PCS | Mod: 50,KX,, | Performed by: ANESTHESIOLOGY

## 2022-05-18 PROCEDURE — 99152 MOD SED SAME PHYS/QHP 5/>YRS: CPT | Performed by: ANESTHESIOLOGY

## 2022-05-18 PROCEDURE — 64491 INJ PARAVERT F JNT C/T 2 LEV: CPT | Mod: 50,KX,, | Performed by: ANESTHESIOLOGY

## 2022-05-18 RX ORDER — LIDOCAINE HYDROCHLORIDE 20 MG/ML
INJECTION, SOLUTION INFILTRATION; PERINEURAL
Status: DISCONTINUED | OUTPATIENT
Start: 2022-05-18 | End: 2022-05-18 | Stop reason: HOSPADM

## 2022-05-18 RX ORDER — MIDAZOLAM HYDROCHLORIDE 1 MG/ML
INJECTION INTRAMUSCULAR; INTRAVENOUS
Status: DISCONTINUED | OUTPATIENT
Start: 2022-05-18 | End: 2022-05-18 | Stop reason: HOSPADM

## 2022-05-18 RX ORDER — BUPIVACAINE HYDROCHLORIDE 2.5 MG/ML
INJECTION, SOLUTION EPIDURAL; INFILTRATION; INTRACAUDAL
Status: DISCONTINUED | OUTPATIENT
Start: 2022-05-18 | End: 2022-05-18 | Stop reason: HOSPADM

## 2022-05-18 NOTE — OP NOTE
Diagnostic Cervical Medial Branch Block under Fluoroscopy Guidance    The procedure, risks, benefits, and options were discussed with the patient. There are no contraindications to the procedure. The patent expressed understanding and agreed to the procedure. Informed written consent was obtained prior to the start of the procedure and can be found in the patient's chart.        PATIENT NAME: Shad Knutson   MRN: [id number]     DATE OF PROCEDURE: 05/18/2022                                                             PROCEDURE:  Diagnostic Bilateral TON, C2, C3 and C4 Cervical Medial Branch Block under Fluoroscopy Guidance    PRE-OP DIAGNOSIS: Cervical spondylosis [M47.812] Cervical Spondylosis [M47.812]    POST-OP DIAGNOSIS: Same    PHYSICIAN: Josse Ayala MD    ASSISTANTS: Dr. Koch    MEDICATIONS INJECTED:  Bupivacaine 0.25%    LOCAL ANESTHETIC INJECTED:   Xylocaine 2%    SEDATION:   Versed 2mg and Fentanyl 0mcg                                                                                                                                                                                     Conscious sedation ordered by M.D. Patient re-evaluation prior to administration of conscious sedation. No changes noted in patient's status from initial evaluation. The patient's vital signs were monitored by RN and patient remained hemodynamically stable throughout the procedure.    Event Time In   Sedation Start 1343   Sedation End 1356       ESTIMATED BLOOD LOSS:  None    COMPLICATIONS:  None.    INDICATIONS: Patient has clinical and imaging findings suggestive of facet mediated pain.    TECHNIQUE:   Time-out was performed to identify the patient and procedure to be performed. With the patient laying in a prone position, the surgical area was prepped and draped in the usual sterile fashion using ChloraPrep and fenestrated drape. The levels were determined under fluoroscopic guidance. Skin anesthesia was achieved by  injecting Lidocaine 2% over the injection sites.  A 25 gauge, 3.5 inch needle was introduced to each level using AP, lateral and/or contralateral oblique fluoroscopic imaging. After negative aspiration for blood or CSF was confirmed, 1 mL of the anesthetic listed above was then slowly injected at each site. The needles were removed and bleeding was nil. A sterile dressing was applied. No specimens collected. The patient tolerated the procedure well.       The patient was monitored after the procedure in the recovery area. They were given post-procedure and discharge instructions to follow at home. The patient was discharged in a stable condition.    I reviewed and edited the fellow's note. I conducted my own interview and physical examination. I agree with the findings. I was present and supervising all critical portions of the procedure.    Josse Ayala MD

## 2022-05-18 NOTE — DISCHARGE INSTRUCTIONS
Thank you for allowing us to care for you today. You may receive a survey about the care we provided. Your feedback is valuable and helps us provide excellent care throughout the community.     Home Care Instructions for Pain Management:    1. DIET:   You may resume your normal diet today.   2. BATHING:   You may shower with luke warm water. No tub baths or anything that will soak injection sites under water for the next 24 hours.  3. DRESSING:   You may remove your bandage today.   4. ACTIVITY LEVEL:   You may resume your normal activities 24 hrs after your procedure. Nothing strenuous today.  5. MEDICATIONS:   You may resume your normal medications today. To restart blood thinners, ask your doctor.  6. DRIVING    If you have received any sedatives by mouth today, you may not drive for 12 hours.    If you have received any sedation through your IV, you may not drive for 24 hrs.   7. SPECIAL INSTRUCTIONS:   No heat to the injection site for 24 hrs including, hot bath or shower, heating pad, moist heat, or hot tubs.    Use ice pack to injection site for any pain or discomfort.  Apply ice packs for 20 minute intervals as needed.    IF you have diabetes, be sure to monitor your blood sugar more closely. IF your injection contained steroids your blood sugar levels may become higher than normal.    If you are still having pain upon discharge:  Your pain may improve over the next 48 hours. The anesthetic (numbing medication) works immediately to 48 hours. IF your injection contained a steroid (anti-inflammatory medication), it takes approximately 3 days to start feeling relief and 7-10 days to see your greatest results from the medication. It is possible you may need subsequent injections. This would be discussed at your follow up appointment with pain management or your referring doctor.    Please call the PAIN MANAGEMENT office at [phone] or ON CALL pager at [phone] if you experienced any:   -Weakness or  loss of sensation  -Fever > 101.5  -Pain uncontrolled with oral medications   -Persistent nausea, vomiting, or diarrhea  -Redness or drainage from the injection sites, or any other worrisome concerns.   If physician on call was not reached or could not communicate with our office for any reason please go to the nearest emergency department.   Lumbar/Cervical/Joint Medial Branch Block Pain Diary    Patient Name:  :    Pre-procedure Pain Score: _____/10    Time of injection:     Please fill out the chart below to the best of your ability. We need to know how much percentage of relief in your pain that you have while the numbing medication is active. Please be mindful that this is a diagnostic test and may not last for a long time. If you are asleep during one of the times listed below, you do not have to record that time.     Time After the   Procedure % of pain relief   achieved Pain Score (0-10)   1 hour post-procedure     2 hours post-procedure     3 hours post-procedure     4 hours post-procedure     5 hours post-procedure     6 hours post-procedure     12 hours post-procedure     24 hours post-procedure       Please make sure to return this form or information to the clinic. This information is needed to proceed with your plan of care. There are several options that you can use to send this back to us.    Form can be faxed to us at [phone]  Call us to provide the information at [phone]  Send the information to us through your [facility] Chart    If you have any questions about completing this diary, please call us at [phone].

## 2022-05-18 NOTE — DISCHARGE SUMMARY
Discharge Note  Short Stay      SUMMARY     Admit Date: 5/18/2022    Attending Physician: Josse Ayala      Discharge Physician: Josse Ayala      Discharge Date: 5/18/2022 1:57 PM    Procedure(s) (LRB):  BLOCK, NERVE, BILATERAL C2-C3-C4 AND THIRD OCCIPITAL  ONE OF TWO (Bilateral)    Final Diagnosis: Cervical spondylosis [M47.812]    Disposition: Home or self care    Patient Instructions:   Current Discharge Medication List      CONTINUE these medications which have NOT CHANGED    Details   ASPIRIN CHILDRENS 81 mg Chew Take by mouth.      atorvastatin (LIPITOR) 20 MG tablet atorvastatin 20 mg tablet      buPROPion (WELLBUTRIN SR) 150 MG TBSR 12 hr tablet bupropion HCl  mg tablet,12 hr sustained-release      calcium carbonate (CALCIUM 600 ORAL) Take by mouth.      escitalopram oxalate (LEXAPRO) 20 MG tablet escitalopram 20 mg tablet      HYDROcodone-acetaminophen (NORCO)  mg per tablet hydrocodone 10 mg-acetaminophen 325 mg tablet   Take 1 tablet every 4 hours by oral route.      levocetirizine (XYZAL) 5 MG tablet Xyzal 5 mg tablet   Take 1 tablet every day by oral route.      meclizine (ANTIVERT) 25 mg tablet       oxyCODONE-acetaminophen (PERCOCET)  mg per tablet Take 1 tablet by mouth every 4 (four) hours as needed for Pain.      pantoprazole (PROTONIX) 40 MG tablet pantoprazole 40 mg tablet,delayed release      tiZANidine (ZANAFLEX) 4 MG tablet TAKE 1 TABLET BY MOUTH ONCE DAILY AS NEEDED FOR MUSCLE SPASM.      vitamin D (VITAMIN D3) 1000 units Tab Take 1,000 Units by mouth once daily. d3                 Discharge Diagnosis: Cervical spondylosis [M47.812]  Condition on Discharge: Stable with no complications to procedure   Diet on Discharge: Same as before.  Activity: as per instruction sheet.  Discharge to: Home with a responsible adult.  Follow up: 2-4 weeks

## 2022-05-19 ENCOUNTER — TELEPHONE (OUTPATIENT)
Dept: PAIN MEDICINE | Facility: CLINIC | Age: 72
End: 2022-05-19
Payer: MEDICARE

## 2022-05-19 NOTE — TELEPHONE ENCOUNTER
----- Message from Chyna Eric sent at 5/19/2022  2:06 PM CDT -----  Who called?:PT      What is the request in detail:PT would like to speak with staff about her pain level after surgery. Please advise         Can the clinic reply by [facility]?:No        Best Call Back Number:[phone]

## 2022-05-23 ENCOUNTER — TELEPHONE (OUTPATIENT)
Dept: PAIN MEDICINE | Facility: OTHER | Age: 72
End: 2022-05-23
Payer: MEDICARE

## 2022-05-23 NOTE — TELEPHONE ENCOUNTER
----- Message from Josse Ayala MD sent at 5/23/2022  9:06 AM CDT -----  It was a difficult to understand for me if she get no relief or 100% pain relief for the reports. Can you double check with her please?       ----- Message -----  From: Shu Vinson  Sent: 5/20/2022  12:57 PM CDT  To: Josse Ayala MD    Please advise if it's ok to scheduled Crvical RFA since pt. Had 100% relief from 5/18/22 .

## 2022-06-02 ENCOUNTER — OFFICE VISIT (OUTPATIENT)
Dept: SPINE | Facility: CLINIC | Age: 72
End: 2022-06-02
Payer: MEDICARE

## 2022-06-02 VITALS
HEART RATE: 75 BPM | SYSTOLIC BLOOD PRESSURE: 118 MMHG | RESPIRATION RATE: 18 BRPM | WEIGHT: 163.13 LBS | TEMPERATURE: 99 F | HEIGHT: 65 IN | DIASTOLIC BLOOD PRESSURE: 77 MMHG | BODY MASS INDEX: 27.18 KG/M2

## 2022-06-02 DIAGNOSIS — G43.709 CHRONIC MIGRAINE WITHOUT AURA WITHOUT STATUS MIGRAINOSUS, NOT INTRACTABLE: ICD-10-CM

## 2022-06-02 DIAGNOSIS — G89.4 CHRONIC PAIN DISORDER: ICD-10-CM

## 2022-06-02 DIAGNOSIS — M47.812 SPONDYLOSIS WITHOUT MYELOPATHY OR RADICULOPATHY, CERVICAL REGION: ICD-10-CM

## 2022-06-02 DIAGNOSIS — M54.81 BILATERAL OCCIPITAL NEURALGIA: Primary | ICD-10-CM

## 2022-06-02 DIAGNOSIS — G44.86 CERVICOGENIC HEADACHE: ICD-10-CM

## 2022-06-02 PROCEDURE — 3288F FALL RISK ASSESSMENT DOCD: CPT | Mod: CPTII,S$GLB,, | Performed by: ANESTHESIOLOGY

## 2022-06-02 PROCEDURE — 1125F PR PAIN SEVERITY QUANTIFIED, PAIN PRESENT: ICD-10-PCS | Mod: CPTII,S$GLB,, | Performed by: ANESTHESIOLOGY

## 2022-06-02 PROCEDURE — 1160F PR REVIEW ALL MEDS BY PRESCRIBER/CLIN PHARMACIST DOCUMENTED: ICD-10-PCS | Mod: CPTII,S$GLB,, | Performed by: ANESTHESIOLOGY

## 2022-06-02 PROCEDURE — 1125F AMNT PAIN NOTED PAIN PRSNT: CPT | Mod: CPTII,S$GLB,, | Performed by: ANESTHESIOLOGY

## 2022-06-02 PROCEDURE — 1100F PTFALLS ASSESS-DOCD GE2>/YR: CPT | Mod: CPTII,S$GLB,, | Performed by: ANESTHESIOLOGY

## 2022-06-02 PROCEDURE — 3078F PR MOST RECENT DIASTOLIC BLOOD PRESSURE < 80 MM HG: ICD-10-PCS | Mod: CPTII,S$GLB,, | Performed by: ANESTHESIOLOGY

## 2022-06-02 PROCEDURE — 3078F DIAST BP <80 MM HG: CPT | Mod: CPTII,S$GLB,, | Performed by: ANESTHESIOLOGY

## 2022-06-02 PROCEDURE — 3008F PR BODY MASS INDEX (BMI) DOCUMENTED: ICD-10-PCS | Mod: CPTII,S$GLB,, | Performed by: ANESTHESIOLOGY

## 2022-06-02 PROCEDURE — 3008F BODY MASS INDEX DOCD: CPT | Mod: CPTII,S$GLB,, | Performed by: ANESTHESIOLOGY

## 2022-06-02 PROCEDURE — 3288F PR FALLS RISK ASSESSMENT DOCUMENTED: ICD-10-PCS | Mod: CPTII,S$GLB,, | Performed by: ANESTHESIOLOGY

## 2022-06-02 PROCEDURE — 3074F PR MOST RECENT SYSTOLIC BLOOD PRESSURE < 130 MM HG: ICD-10-PCS | Mod: CPTII,S$GLB,, | Performed by: ANESTHESIOLOGY

## 2022-06-02 PROCEDURE — 1159F MED LIST DOCD IN RCRD: CPT | Mod: CPTII,S$GLB,, | Performed by: ANESTHESIOLOGY

## 2022-06-02 PROCEDURE — 99214 PR OFFICE/OUTPT VISIT, EST, LEVL IV, 30-39 MIN: ICD-10-PCS | Mod: S$GLB,,, | Performed by: ANESTHESIOLOGY

## 2022-06-02 PROCEDURE — 99999 PR PBB SHADOW E&M-EST. PATIENT-LVL III: CPT | Mod: PBBFAC,,, | Performed by: ANESTHESIOLOGY

## 2022-06-02 PROCEDURE — 99214 OFFICE O/P EST MOD 30 MIN: CPT | Mod: S$GLB,,, | Performed by: ANESTHESIOLOGY

## 2022-06-02 PROCEDURE — 1100F PR PT FALLS ASSESS DOC 2+ FALLS/FALL W/INJURY/YR: ICD-10-PCS | Mod: CPTII,S$GLB,, | Performed by: ANESTHESIOLOGY

## 2022-06-02 PROCEDURE — 1160F RVW MEDS BY RX/DR IN RCRD: CPT | Mod: CPTII,S$GLB,, | Performed by: ANESTHESIOLOGY

## 2022-06-02 PROCEDURE — 99999 PR PBB SHADOW E&M-EST. PATIENT-LVL III: ICD-10-PCS | Mod: PBBFAC,,, | Performed by: ANESTHESIOLOGY

## 2022-06-02 PROCEDURE — 1159F PR MEDICATION LIST DOCUMENTED IN MEDICAL RECORD: ICD-10-PCS | Mod: CPTII,S$GLB,, | Performed by: ANESTHESIOLOGY

## 2022-06-02 PROCEDURE — 3074F SYST BP LT 130 MM HG: CPT | Mod: CPTII,S$GLB,, | Performed by: ANESTHESIOLOGY

## 2022-06-02 NOTE — PROGRESS NOTES
Chronic Pain - New Consult      Chief Complaint: Headache and neck pain       SUBJECTIVE:    Interval history 6/2/2022:  Shad Knutson returns to clinic for follow-up of headache and neck pain. She is s/p bilateral C2,3,4 and TON MBB with no relief. Her pain remains the same in character and distribution. Worse in intensity over the last three days. Mostly located over the right frontal area and associated with severe nausea. She still has a significant component of occipital headache.    Initial HPI 5/3/2022:  hSad Knutson presents to the clinic for the evaluation of headache pain. The pain started many years ago  That acutely worsen after hyterectomy and symptoms have been unchanged.The pain is located in the occipital area and radiates to the bilateral temples and forehead.  The pain is described as aching, dull, pulsating, sharp and shooting and is rated as 8/10. The pain is rated with a score of  5/10 on the BEST day and a score of 10/10 on the WORST day.  Symptoms interfere with daily activity, sleeping and work. Symptoms associated with nausea, phonophobia, mild photophobia. No diplopia, no vertigo, no numbness, no aphasia, no dysarthria, no facial asymentry, no changes in vision, no upper extremity weakness. The pain is exacerbated by neck movement.  The pain is mitigated by massage, is the only thing that has helped so far. In the past she has tried; occipital nerve surgical decompression , botox, RENETTA and MERRITT block, multiple medications eptinezumab infusion all without any relief in symptoms. MRI brain from 2018 unremarkable. MRI C spine from 12/16/2021 remarkable for C3-C5 facet arthropathy. Past C6-7 ACDF 2006.    Patient denies night fever/night sweats, urinary incontinence, bowel incontinence, significant weight loss, significant motor weakness and loss of sensations.    Physical Therapy/Home Exercise: not applicable      Pain Disability Index Review:  Last 3 PDI Scores 6/2/2022   Pain Disability  Index (PDI) 50       Pain Procedures:   2022: Bilateral C2,3,4 and TON MBB - no relief  10/28/2021 bilateral RENETTA and MERRITT blocks- no relief  Botox- no relief  Surgical occipital nerve release - no relief.     Imaging:   MRI C spine 2021  Degenerative findings:     C2-C3: No spinal canal stenosis or neural foraminal narrowing.     C3-C4: Posterior disc osteophyte complex and facet arthropathy result in mild bilateral neural foraminal narrowing.     C4-C5: Posterior disc osteophyte complex effacing the anterior thecal sac and bilateral facet arthropathy, right greater than left, result in mild bilateral neural foraminal narrowing.     C5-C6: No spinal canal stenosis or neural foraminal narrowing.     C6-C7: Posterior disc osteophyte complex asymmetric to the left which abuts the anterior thecal sac.  No spinal canal stenosis or neural foraminal narrowing.     C7-T1: No spinal canal stenosis or neural foraminal narrowing.     Paraspinal muscles & soft tissues: Unremarkable.     Impression:     Mild cervical spondylosis with C3-C5 mild spinal canal narrowing and bilateral neural foraminal narrowing.    Past Medical History:   Diagnosis Date    Headache     Hearing loss     Mental disorder      Past Surgical History:   Procedure Laterality Date    APPENDECTOMY       SECTION      FOOT SURGERY      HYSTERECTOMY      INJECTION OF ANESTHETIC AGENT AROUND NERVE Bilateral 2022    Procedure: BLOCK, NERVE, BILATERAL C2-C3-C4 AND THIRD OCCIPITAL  ONE OF TWO;  Surgeon: Josse Ayala MD;  [facility];  Service: Pain Management;  Laterality: Bilateral;    NECK SURGERY      TONSILLECTOMY      WRIST SURGERY       Social History     Socioeconomic History    Marital status:    Tobacco Use    Smoking status: Never Smoker    Smokeless tobacco: Never Used   Substance and Sexual Activity    Alcohol use: Yes    Drug use: Never    Sexual activity: Yes     Partners: Male     Family  History   Problem Relation Age of Onset    Breast cancer Mother     Breast cancer Maternal Aunt     Breast cancer Maternal Grandmother     Breast cancer Paternal Grandmother        Review of patient's allergies indicates:   Allergen Reactions    Penicillins Nausea Only and Other (See Comments)     Passed out. No reaction to Cephalosporins         Current Outpatient Medications   Medication Sig    ASPIRIN CHILDRENS 81 mg Chew Take by mouth.    atorvastatin (LIPITOR) 20 MG tablet atorvastatin 20 mg tablet    buPROPion (WELLBUTRIN SR) 150 MG TBSR 12 hr tablet bupropion HCl  mg tablet,12 hr sustained-release    calcium carbonate (CALCIUM 600 ORAL) Take by mouth.    escitalopram oxalate (LEXAPRO) 20 MG tablet escitalopram 20 mg tablet    HYDROcodone-acetaminophen (NORCO)  mg per tablet hydrocodone 10 mg-acetaminophen 325 mg tablet   Take 1 tablet every 4 hours by oral route.    levocetirizine (XYZAL) 5 MG tablet Xyzal 5 mg tablet   Take 1 tablet every day by oral route.    meclizine (ANTIVERT) 25 mg tablet     pantoprazole (PROTONIX) 40 MG tablet pantoprazole 40 mg tablet,delayed release    tiZANidine (ZANAFLEX) 4 MG tablet TAKE 1 TABLET BY MOUTH ONCE DAILY AS NEEDED FOR MUSCLE SPASM.    vitamin D (VITAMIN D3) 1000 units Tab Take 1,000 Units by mouth once daily. d3    oxyCODONE-acetaminophen (PERCOCET)  mg per tablet Take 1 tablet by mouth every 4 (four) hours as needed for Pain.     No current facility-administered medications for this visit.       REVIEW OF SYSTEMS:    GENERAL:  No weight loss, malaise or fevers.  HEENT:  Positive: headaches.  NECK:  Pain. Negative for lumps, goiter,and significant neck swelling.  RESPIRATORY:  Negative for cough, wheezing or shortness of breath.  CARDIOVASCULAR:  Negative for chest pain, leg swelling or palpitations.  GI:  Negative for abdominal discomfort, blood in stools or black stools or change in bowel habits.  MUSCULOSKELETAL:  See HPI.  SKIN:   "Negative for lesions, rash, and itching.  PSYCH:  Negative for sleep disturbance, mood disorder and recent psychosocial stressors.  HEMATOLOGY/LYMPHOLOGY:  Negative for prolonged bleeding, bruising easily or swollen nodes.  NEURO:  Headaches,No: syncope, paralysis, seizures or tremors.  All other reviewed and negative other than HPI.    OBJECTIVE:    /77   Pulse 75   Temp 98.6 °F (37 °C)   Resp 18   Ht 5' 5" (1.651 m)   Wt 74 kg (163 lb 2.3 oz)   BMI 27.15 kg/m²     PHYSICAL EXAMINATION:    General appearance: Well appearing, in no acute distress, alert and oriented x3.  Psych:  Mood and affect appropriate.  Skin: Skin color, texture, turgor normal, no rashes or lesions, in both upper and lower body.  Head/face:  Normocephalic, atraumatic. No palpable lymph nodes. TTP over occiput at anatomical area of bilateral RENETTA and MERRITT  Neck: TTP over upper cervical paraspinal muscles.  Spurling Negative. Pain with neck flexion, extension, or lateral flexion.   Musculoskeletal:  Bilateral upper extremity strength is normal and symmetric.  No atrophy or tone abnormalities are noted.  Neuro: Bilateral upper  extremity coordination and muscle stretch reflexes are physiologic and symmetric. No loss of sensation is noted.  Gait: normal. CN intact 2-12    ASSESSMENT: 72 y.o. year old female with chronic headache pain, consistent with      1. Bilateral occipital neuralgia     2. Cervicogenic headache     3. Spondylosis without myelopathy or radiculopathy, cervical region     4. Chronic pain disorder     5. Chronic migraine without aura without status migrainosus, not intractable           PLAN:     - Schedule for bilateral occipital nerve block at C1/2 under ultrasound  - If no long term relief after this procedure we can then consider PNS trial.    - RTC: 2 weeks post-procedure  - Counseled patient regarding the importance of activity modification, constant sleeping habits and physical therapy.    The above plan and " management options were discussed at length with patient. Patient is in agreement with the above and verbalized understanding. It will be communicated with the referring physician via electronic record, fax, or mail.    Carl Milan  06/02/2022     I have reviewed and concur with the resident's history, physical, assessment, and plan.  I have personally interviewed and examined the patient at bedside.  See below addendum for my evaluation and additional findings.    Josse Ayala MD

## 2022-07-12 ENCOUNTER — TELEPHONE (OUTPATIENT)
Dept: SPINE | Facility: CLINIC | Age: 72
End: 2022-07-12
Payer: MEDICARE

## 2022-07-12 NOTE — TELEPHONE ENCOUNTER
----- Message from Melanie Huynh sent at 7/12/2022  9:45 AM CDT -----  Contact: Sal Knutson (Spouse)  Type: Call Back      Who called: Sal Knutson (Spouse)      What is the request in detail: Sal Knutson (Spouse) called in stating that he would like to cancel the procedure. Please advise.       Can the clinic reply by [facility]? No      Would the patient rather a call back or a response via [facility]? Call back       Best call back number: [phone]      Additional Information:

## (undated) DEVICE — DRESSING LEUKOPLAST FLEX 1X3IN